# Patient Record
Sex: FEMALE | Race: OTHER | HISPANIC OR LATINO | URBAN - METROPOLITAN AREA
[De-identification: names, ages, dates, MRNs, and addresses within clinical notes are randomized per-mention and may not be internally consistent; named-entity substitution may affect disease eponyms.]

---

## 2020-03-25 ENCOUNTER — EMERGENCY (EMERGENCY)
Facility: HOSPITAL | Age: 25
LOS: 1 days | Discharge: HOME | End: 2020-03-25
Attending: EMERGENCY MEDICINE | Admitting: EMERGENCY MEDICINE
Payer: SELF-PAY

## 2020-03-25 VITALS
OXYGEN SATURATION: 100 % | SYSTOLIC BLOOD PRESSURE: 104 MMHG | TEMPERATURE: 99 F | RESPIRATION RATE: 16 BRPM | HEART RATE: 78 BPM | DIASTOLIC BLOOD PRESSURE: 78 MMHG

## 2020-03-25 PROCEDURE — 99283 EMERGENCY DEPT VISIT LOW MDM: CPT

## 2020-03-25 NOTE — ED PROVIDER NOTE - CLINICAL SUMMARY MEDICAL DECISION MAKING FREE TEXT BOX
25 year old with no PMH who presents with fever and cough x 3 days. No shortness of breath or difficulty breathing. Tolerating PO. On exam, clear lungs bilaterally without wheezes or crackles. Abdomen is soft and nontender. Will DC home.

## 2020-03-25 NOTE — ED ADULT TRIAGE NOTE - CHIEF COMPLAINT QUOTE
Ambulatory c/o fever and cough x 1 week, respirations even and unlabored. Coworker tested + for COVID-19, has not been in contact for two weeks. Denies PMH

## 2020-03-25 NOTE — ED PROVIDER NOTE - NSFOLLOWUPINSTRUCTIONS_ED_ALL_ED_FT
You received verbal instructions and did not sign because of the risk of infection, and expressed understanding of the discharge instructions. Please followup with your doctor (call) and consider follow up in his/her office. Please practice good hand hygiene and social distancing. If fever, cough, shortness of breath, or any worse symptoms return to the ER.  If you have symptoms, consideration to quarantine yourself for 14 days from start of symptoms should be considered.  You can call [phone] for any Covid related questions or your local department of health. ([facility] Dept of Health [phone], or [facility]).

## 2020-03-25 NOTE — ED PROVIDER NOTE - PLAN OF CARE
1. Please give Tylenol every 6 hours for fever as discussed.   2. Please return if you notice difficulty breathing or shortness of breath.   3. Please return if unable to tolerate any liquids, poor urine output, or fevers >106F.

## 2020-03-25 NOTE — ED PROVIDER NOTE - PATIENT PORTAL LINK FT
You can access the FollowMyHealth Patient Portal offered by [facility] by registering at the following website: [email or web address]. By joining [facility]’s FollowMyHealth portal, you will also be able to view your health information using other applications (apps) compatible with our system.

## 2020-03-25 NOTE — ED PROVIDER NOTE - CARE PLAN
Principal Discharge DX:	Viral syndrome  Assessment and plan of treatment:	1. Please give Tylenol every 6 hours for fever as discussed.   2. Please return if you notice difficulty breathing or shortness of breath.   3. Please return if unable to tolerate any liquids, poor urine output, or fevers >106F.

## 2020-03-25 NOTE — ED PROVIDER NOTE - OBJECTIVE STATEMENT
Tess is a 25 year old female with no PMH who presents with fever and cough x 2 days. No shortness of breath. Works as an intern at a company in [place] - has not been in to work in 2 weeks but was told her co workers tested positive. No vomiting or diarrhea. Tolerating PO. Normal UO. No rashes.     PMH: None  PSH: None  Meds: None  Vaccines: UTD

## 2022-09-08 NOTE — ED PROVIDER NOTE - NEUROLOGICAL, MLM
Alert and oriented, no focal deficits, no motor or sensory deficits. awake/alert/oriented to person, place, time/situation